# Patient Record
Sex: MALE | Race: WHITE
[De-identification: names, ages, dates, MRNs, and addresses within clinical notes are randomized per-mention and may not be internally consistent; named-entity substitution may affect disease eponyms.]

---

## 2018-01-22 ENCOUNTER — HOSPITAL ENCOUNTER (EMERGENCY)
Dept: HOSPITAL 74 - JER | Age: 83
Discharge: LEFT BEFORE DISCHARGE | End: 2018-01-22
Payer: COMMERCIAL

## 2018-01-22 VITALS — DIASTOLIC BLOOD PRESSURE: 95 MMHG | HEART RATE: 75 BPM | TEMPERATURE: 98.6 F | SYSTOLIC BLOOD PRESSURE: 145 MMHG

## 2018-01-22 VITALS — BODY MASS INDEX: 31.1 KG/M2

## 2018-01-22 DIAGNOSIS — R42: Primary | ICD-10-CM

## 2018-01-22 DIAGNOSIS — Z86.73: ICD-10-CM

## 2018-01-22 DIAGNOSIS — Z95.0: ICD-10-CM

## 2018-01-22 DIAGNOSIS — I10: ICD-10-CM

## 2018-01-22 DIAGNOSIS — Z79.01: ICD-10-CM

## 2018-01-22 DIAGNOSIS — E78.00: ICD-10-CM

## 2018-01-22 DIAGNOSIS — I48.91: ICD-10-CM

## 2018-01-22 DIAGNOSIS — R06.02: ICD-10-CM

## 2018-01-22 LAB
ALBUMIN SERPL-MCNC: 3.9 G/DL (ref 3.4–5)
ALP SERPL-CCNC: 86 U/L (ref 45–117)
ALT SERPL-CCNC: 27 U/L (ref 12–78)
ANION GAP SERPL CALC-SCNC: 5 MMOL/L (ref 8–16)
APPEARANCE UR: CLEAR
AST SERPL-CCNC: 13 U/L (ref 15–37)
BASOPHILS # BLD: 0.4 % (ref 0–2)
BILIRUB SERPL-MCNC: 0.8 MG/DL (ref 0.2–1)
BILIRUB UR STRIP.AUTO-MCNC: NEGATIVE MG/DL
BUN SERPL-MCNC: 28 MG/DL (ref 7–18)
CALCIUM SERPL-MCNC: 8.9 MG/DL (ref 8.5–10.1)
CHLORIDE SERPL-SCNC: 104 MMOL/L (ref 98–107)
CO2 SERPL-SCNC: 29 MMOL/L (ref 21–32)
COLOR UR: YELLOW
CREAT SERPL-MCNC: 1.6 MG/DL (ref 0.7–1.3)
DEPRECATED RDW RBC AUTO: 13.9 % (ref 11.9–15.9)
EOSINOPHIL # BLD: 2.6 % (ref 0–4.5)
GLUCOSE SERPL-MCNC: 98 MG/DL (ref 74–106)
HCT VFR BLD CALC: 51.3 % (ref 35.4–49)
HGB BLD-MCNC: 16.8 GM/DL (ref 11.7–16.9)
KETONES UR QL STRIP: NEGATIVE
LEUKOCYTE ESTERASE UR QL STRIP.AUTO: NEGATIVE
LYMPHOCYTES # BLD: 19.2 % (ref 8–40)
MCH RBC QN AUTO: 29.8 PG (ref 25.7–33.7)
MCHC RBC AUTO-ENTMCNC: 32.8 G/DL (ref 32–35.9)
MCV RBC: 90.8 FL (ref 80–96)
MONOCYTES # BLD AUTO: 9.6 % (ref 3.8–10.2)
NEUTROPHILS # BLD: 68.2 % (ref 42.8–82.8)
NITRITE UR QL STRIP: NEGATIVE
PH UR: 5 [PH] (ref 5–8)
PLATELET # BLD AUTO: 204 K/MM3 (ref 134–434)
PLATELET BLD QL SMEAR: ADEQUATE
PMV BLD: 10.1 FL (ref 7.5–11.1)
POTASSIUM SERPLBLD-SCNC: 4.5 MMOL/L (ref 3.5–5.1)
PROT SERPL-MCNC: 7.9 G/DL (ref 6.4–8.2)
PROT UR QL STRIP: NEGATIVE
PROT UR QL STRIP: NEGATIVE
RBC # BLD AUTO: 5.65 M/MM3 (ref 4–5.6)
RBC # UR STRIP: NEGATIVE /UL
SODIUM SERPL-SCNC: 138 MMOL/L (ref 136–145)
SP GR UR: 1.02 (ref 1–1.03)
UROBILINOGEN UR STRIP-MCNC: 2 MG/DL (ref 0.2–1)
WBC # BLD AUTO: 10.1 K/MM3 (ref 4–10)

## 2018-01-22 NOTE — PDOC
History of Present Illness





- General


Chief Complaint: Lightheaded


Stated Complaint: NEAR SYNCOPE/ DIZZINESS


Time Seen by Provider: 01/22/18 14:56


History Source: Patient





- History of Present Illness


Associated Symptoms: denies: chest pain, fever/chills, headaches, nausea/

vomiting, shortness of breath, syncope





Past History





- Past Medical History


Allergies/Adverse Reactions: 


 Allergies











Allergy/AdvReac Type Severity Reaction Status Date / Time


 


No Known Allergies Allergy   Verified 01/22/18 12:19











Home Medications: 


Ambulatory Orders





Hydrochlorothiazide [Hctz] 12.5 mg PO DAILY 08/03/12 


Atorvastatin Ca [Lipitor] 10 mg PO HS 09/19/16 


Carvedilol 25 mg PO BID 09/19/16 


Losartan Potassium [Cozaar -] 50 mg PO DAILY 09/19/16 


Warfarin Sodium [Coumadin] 5 mg PO ASDIR 09/20/16 


Amlodipine Besylate 5 mg PO DAILY 01/22/18 








Anemia: No


Asthma: No


Cancer: No


Cardiac Disorders: Yes (CAD)


CVA: Yes


COPD: No


CHF: No


DVT: No


Dementia: No


Diabetes: No


GI Disorders: No


 Disorders: No


HTN: Yes


Hypercholesterolemia: Yes


Liver Disease: No


Seizures: No


Thyroid Disease: No





- Surgical History


Abdominal Surgery: No


Appendectomy: No


Cardiac Surgery: Yes (PACEMAKER 2011)


Cholecystectomy: No


Lung Surgery: No


Neurologic Surgery: No


Orthopedic Surgery: No





- Suicide/Smoking/Psychosocial Hx


Smoking Status: No


Smoking History: Never smoked


Have you smoked in the past 12 months: No


Number of Cigarettes Smoked Daily: 0


Information on smoking cessation initiated: No


Hx Alcohol Use: Yes (SOCIALLY)


Drug/Substance Use Hx: No


Substance Use Type: None


Hx Substance Use Treatment: No





**Review of Systems





- Review of Systems


Constitutional: No: Chills, Fever


HEENTM: No: Blurred Vision


Respiratory: No: Cough, Shortness of Breath


Cardiac (ROS): No: Chest Pain, Palpitations, Syncope


ABD/GI: No: Nausea, Vomiting


Neurological: Yes: Dizziness.  No: Headache, Numbness, Tingling, Weakness





*Physical Exam





- Vital Signs


 Last Vital Signs











Temp Pulse Resp BP Pulse Ox


 


 98.6 F   75   18   145/95   100 


 


 01/22/18 12:22  01/22/18 12:22  01/22/18 12:22  01/22/18 12:22  01/22/18 12:22














- Physical Exam


General Appearance: Yes: Appropriately Dressed.  No: Apparent Distress


HEENT: positive: Normal Voice


Neck: positive: Supple


Respiratory/Chest: positive: Lungs Clear, Normal Breath Sounds.  negative: 

Respiratory Distress


Cardiovascular: positive: Regular Rate, S1, S2


Gastrointestinal/Abdominal: positive: Soft.  negative: Tender


Integumentary: positive: Dry, Warm


Neurologic: positive: Fully Oriented, Alert, Normal Mood/Affect, Motor Strength 

5/5, Finger to Nose.  negative: Facial Droop, Confused, Disoriented (no 

nystagmus, El intact, no drift, no ataxia)





**Heart Score/ECG Review





- ECG Intrepretation


Comment:: 





01/22/18 17:19


Paced rhythym





ED Treatment Course





- LABORATORY


CBC & Chemistry Diagram: 


 01/22/18 15:25





 01/22/18 15:25





- RADIOLOGY


Radiology Studies Ordered: 














 Category Date Time Status


 


 HEAD CT WITHOUT CONTRAST [CT] Stat CT Scan  01/22/18 15:01 Ordered


 


 CHEST X-RAY PORTABLE* [RAD] Stat Radiology  01/22/18 14:58 Ordered














Medical Decision Making





- Medical Decision Making





01/22/18 15:23


84-year-old male history of hyperlipidemia, hypertension, pacemaker, A.fib on 

coumadin, CVA without deficits, here with dizziness.  Patient states around 9:

30 AM today he developed sudden onset sensation of the room spinning while 

ambulating, causing him to have a near fall. Denies head injury or LOC. Denies 

HA,  nausea, vomiting, blurry vision or focal weakness. No chest pain or 

shortness of breath.  States dizziness lasted for 5 minutes and has since 

resolved with no recurrence.  Current symptoms are different from his prior CVA 

which presented with confusion per patient and wife. No recent uri, tinnitus or 

HL





See exam





Vertigo this am, since resolved


No associated sxs


No recent URI


Stable w/ no neurological deficits


Possibly peripheral, less likely central given presentation


-ekg


-labs


-head CT


-will discuss further w/u w/ ED attg








01/22/18 17:31


CT read as no acute infarcts or bleed.  There are lacunar infarcts seen of that 

is age indeterminate.  Patient remains stable and well appearing with no 

further episodes of vertigo.  Will discuss case w/ neuro and with PMD, Dr. Raza








01/22/18 17:59


Dr Raza paged several times w/ no call back. Also called on his cell and left 

message to call back. 





01/22/18 18:02





01/22/18 18:24


Case and CT findings discussed with Dr King of neurology who recommended at 

least admitting overnight on telemetry.  M.D. states he will evaluate patient 

later on tonight or in the a.m.  I informed patient of recommendation per 

neurology, but patient adamantly refuses admission.  Was informed of medical 

risks to himself, such as stroke and/or death.  Patient verbalized understanding

, but still insists on leaving.  Appears to have capacity at this time.  

Patient signed AMA forms, had IV removed by ED nurse, and walked out of ED with 

wife. At this time, there was no call back from Dr Raza











*DC/Admit/Observation/Transfer


Diagnosis at time of Disposition: 


 Vertigo








- Discharge Dispostion


Disposition: AGAINST MEDICAL ADVICE


Condition at time of disposition: Stable





- Referrals


Referrals: 


Du Raza MD, MD [Primary Care Provider] - 





- Patient Instructions


Printed Discharge Instructions:  Vertigo


Additional Instructions: 


You have left AGAINST MEDICAL ADVICE despite being informed of the wrist 

yourselves such as stroke or death.  You are aware that you can return to the 

ER at any point to continue your evaluation.  


Please contact your PMD in the morning





- Post Discharge Activity

## 2018-01-24 NOTE — EKG
Test Reason : 

Blood Pressure : ***/*** mmHG

Vent. Rate : 076 BPM     Atrial Rate : 090 BPM

   P-R Int : 000 ms          QRS Dur : 182 ms

    QT Int : 422 ms       P-R-T Axes : 000 -78 086 degrees

   QTc Int : 474 ms

 

Ventricular-paced rhythm

ABNORMAL ECG

WHEN COMPARED WITH ECG OF 03-AUG-2012 16:07,

ELECTRONIC VENTRICULAR PACEMAKER HAS REPLACED SINUS RHYTHM

Confirmed by ROSALVA BARBA MD ([id number]) on 1/24/2018 8:07:04 AM

 

Referred By:             Confirmed By:ROSALVA BARBA MD

## 2020-11-02 ENCOUNTER — HOSPITAL ENCOUNTER (OUTPATIENT)
Dept: HOSPITAL 74 - JER | Age: 85
Setting detail: OBSERVATION
LOS: 2 days | Discharge: HOME | End: 2020-11-04
Attending: FAMILY MEDICINE | Admitting: INTERNAL MEDICINE
Payer: COMMERCIAL

## 2020-11-02 VITALS — BODY MASS INDEX: 31.1 KG/M2

## 2020-11-02 DIAGNOSIS — E78.00: ICD-10-CM

## 2020-11-02 DIAGNOSIS — Z86.73: ICD-10-CM

## 2020-11-02 DIAGNOSIS — Z79.01: ICD-10-CM

## 2020-11-02 DIAGNOSIS — Z95.0: ICD-10-CM

## 2020-11-02 DIAGNOSIS — I11.0: ICD-10-CM

## 2020-11-02 DIAGNOSIS — E66.9: ICD-10-CM

## 2020-11-02 DIAGNOSIS — R40.4: ICD-10-CM

## 2020-11-02 DIAGNOSIS — I25.10: ICD-10-CM

## 2020-11-02 DIAGNOSIS — G45.9: Primary | ICD-10-CM

## 2020-11-02 DIAGNOSIS — I48.91: ICD-10-CM

## 2020-11-02 DIAGNOSIS — Z23: ICD-10-CM

## 2020-11-02 LAB
ALBUMIN SERPL-MCNC: 3.3 G/DL (ref 3.4–5)
ALP SERPL-CCNC: 77 U/L (ref 45–117)
ALT SERPL-CCNC: 21 U/L (ref 13–61)
ANION GAP SERPL CALC-SCNC: 8 MMOL/L (ref 8–16)
APPEARANCE UR: CLEAR
APTT BLD: 34.9 SECONDS (ref 25.2–36.5)
AST SERPL-CCNC: 18 U/L (ref 15–37)
BACTERIA # UR AUTO: 5.7 /UL (ref 0–1359)
BASOPHILS # BLD: 0.5 % (ref 0–2)
BILIRUB SERPL-MCNC: 0.7 MG/DL (ref 0.2–1)
BILIRUB UR STRIP.AUTO-MCNC: NEGATIVE MG/DL
BUN SERPL-MCNC: 33.1 MG/DL (ref 7–18)
CALCIUM SERPL-MCNC: 8.7 MG/DL (ref 8.5–10.1)
CHLORIDE SERPL-SCNC: 103 MMOL/L (ref 98–107)
CHOLEST SERPL-MCNC: 133 MG/DL (ref 50–200)
CO2 SERPL-SCNC: 26 MMOL/L (ref 21–32)
COLOR UR: YELLOW
CREAT SERPL-MCNC: 1.5 MG/DL (ref 0.55–1.3)
DEPRECATED RDW RBC AUTO: 14.1 % (ref 11.9–15.9)
EOSINOPHIL # BLD: 2.4 % (ref 0–4.5)
EPITH CASTS URNS QL MICRO: 1.4 /UL (ref 0–25.1)
GLUCOSE SERPL-MCNC: 88 MG/DL (ref 74–106)
HCT VFR BLD CALC: 49.1 % (ref 35.4–49)
HDLC SERPL-MCNC: 47 MG/DL (ref 40–60)
HGB BLD-MCNC: 15.9 GM/DL (ref 11.7–16.9)
INR BLD: 2.29 (ref 0.83–1.09)
KETONES UR QL STRIP: NEGATIVE
LDLC SERPL CALC-MCNC: 79 MG/DL (ref 5–100)
LEUKOCYTE ESTERASE UR QL STRIP.AUTO: NEGATIVE
LYMPHOCYTES # BLD: 23.7 % (ref 8–40)
MCH RBC QN AUTO: 29.2 PG (ref 25.7–33.7)
MCHC RBC AUTO-ENTMCNC: 32.5 G/DL (ref 32–35.9)
MCV RBC: 90 FL (ref 80–96)
MONOCYTES # BLD AUTO: 11.8 % (ref 3.8–10.2)
NEUTROPHILS # BLD: 61.6 % (ref 42.8–82.8)
NITRITE UR QL STRIP: NEGATIVE
PH UR: 5.5 [PH] (ref 5–8)
PLATELET # BLD AUTO: 197 K/MM3 (ref 134–434)
PMV BLD: 9.5 FL (ref 7.5–11.1)
POTASSIUM SERPLBLD-SCNC: 4.8 MMOL/L (ref 3.5–5.1)
PROT SERPL-MCNC: 6.7 G/DL (ref 6.4–8.2)
PROT UR QL STRIP: NEGATIVE
PROT UR QL STRIP: NEGATIVE
PT PNL PPP: 27 SEC (ref 9.7–13)
RBC # BLD AUTO: 1.5 /UL (ref 0–23.9)
RBC # BLD AUTO: 5.45 M/MM3 (ref 4–5.6)
SODIUM SERPL-SCNC: 136 MMOL/L (ref 136–145)
SP GR UR: 1.05 (ref 1.01–1.03)
TRIGL SERPL-MCNC: 193 MG/DL (ref 0–150)
UROBILINOGEN UR STRIP-MCNC: 1 MG/DL (ref 0.2–1)
WBC # BLD AUTO: 8.8 K/MM3 (ref 4–10)

## 2020-11-02 PROCEDURE — 3E0234Z INTRODUCTION OF SERUM, TOXOID AND VACCINE INTO MUSCLE, PERCUTANEOUS APPROACH: ICD-10-PCS | Performed by: FAMILY MEDICINE

## 2020-11-02 PROCEDURE — C9803 HOPD COVID-19 SPEC COLLECT: HCPCS

## 2020-11-02 PROCEDURE — G0378 HOSPITAL OBSERVATION PER HR: HCPCS

## 2020-11-02 PROCEDURE — U0003 INFECTIOUS AGENT DETECTION BY NUCLEIC ACID (DNA OR RNA); SEVERE ACUTE RESPIRATORY SYNDROME CORONAVIRUS 2 (SARS-COV-2) (CORONAVIRUS DISEASE [COVID-19]), AMPLIFIED PROBE TECHNIQUE, MAKING USE OF HIGH THROUGHPUT TECHNOLOGIES AS DESCRIBED BY CMS-2020-01-R: HCPCS

## 2020-11-02 RX ADMIN — SODIUM CHLORIDE SCH MLS/HR: 9 INJECTION, SOLUTION INTRAVENOUS at 17:50

## 2020-11-02 NOTE — PDOC
History of Present Illness





<Zak Hurley - Last Filed: 11/02/20 19:53>





- History of Present Illness


Initial Comments: 





11/02/20 17:07


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 

weakness. LKW was 1630 per wife. She walked into the living room and asked him 

to stand up. He replied "I don't know how." Then he became aphasic. On 

presentation he was not answering questions consistently and demonstrated RUE 

weakness.





He has been seen here 2 times already here within the last week for similar 

presentations. He has received multiple CT scans. Upon this patient's first 

encounter for these episodes about 1 week ago, he was admitted to the hospital 

and then AMA'ed himself three hours after admission.





<Al Perez - Last Filed: 11/04/20 21:57>





- General


Chief Complaint: CVA/TIA


Stated Complaint: POSS STROKE


Time Seen by Provider: 11/02/20 17:05





NIH Stroke Scale





- Initial Evaluation


Level of consciousness: Alert


Ask patient the month and their age: Answers both correctly


Ask patient to open & close eyes; make fist and let go: Obeys both correctly


Best gaze (horizontal eye movement): Normal


Visual field testing: No visual field loss


Facial paresis (Show teeth/raise eyebrows/close eyes tight): Normal symmetrical 

movement


Motor Function: Left Arm: Normal


Motor Function:  Right Arm: Normal (extends arm 90 (or 45) degrees for 10 

seconds without drift


Motor Function:  Left Leg: Normal (extends leg 30 degrees for 5 seconds without 

drift)


Motor Function:  Right Leg: Normal (extends leg 30 degrees for 5 seconds without

drift)


Limb Ataxia: Untestable (Joint fused or limb amputated), explain:


Sensory(Use pinprick test arms,legs,trunk,face/side to side): Normal


Best language (Describe picture, name items, read sentences): No Aphasia


Dysarthria (read several words): Mild to moderate slurring of words


Extinction and Inattention: No abnormality





- Total Score


NIH Stroke Scale Score: 1





<Al Perez - Last Filed: 11/04/20 21:57>





Past History





<Zak Hurley - Last Filed: 11/02/20 19:53>





- Medical History


Anemia: No


Asthma: No


Cancer: No


Cardiac Disorders: Yes (CAD)


CVA: Yes


COPD: No


CHF: No


DVT: No


Dementia: No


Diabetes: No


GI Disorders: No


 Disorders: No


HTN: Yes


Hypercholesterolemia: Yes


Liver Disease: No


Seizures: No


Thyroid Disease: No





- Surgical History


Abdominal Surgery: No


Appendectomy: No


Cardiac Surgery: Yes (PACEMAKER 2011)


Cholecystectomy: No


Lung Surgery: No


Neurologic Surgery: No


Orthopedic Surgery: No





- Immunization History


Immunization Up to Date: Yes





- Psycho-Social/Smoking History


Smoking Status: No


Smoking History: Never smoked


Have you smoked in the past 12 months: No


Number of Cigarettes Smoked Daily: 0





<Al Perez - Last Filed: 11/04/20 21:57>





- Medical History


Allergies/Adverse Reactions: 


                                    Allergies











Allergy/AdvReac Type Severity Reaction Status Date / Time


 


No Known Allergies Allergy   Verified 10/30/20 10:00











Home Medications: 


Ambulatory Orders





Hydrochlorothiazide [Hctz] 12.5 mg PO DAILY 08/03/12 


Atorvastatin Ca [Lipitor] 10 mg PO HS 09/19/16 


Carvedilol 25 mg PO BID 09/19/16 


Losartan Potassium [Cozaar -] 50 mg PO DAILY 09/19/16 


Warfarin Sodium [Coumadin] 5 mg PO ASDIR 09/20/16 


Amlodipine Besylate 5 mg PO DAILY 01/22/18 


Aspirin [ASA -] 81 mg PO DAILY #30 tab.chew 11/03/20 


Warfarin Na [Coumadin -] 5 mg PO DAILY@1800  tablet 11/03/20 











**Review of Systems





- Review of Systems


Able to Perform ROS?: Yes


Constitutional: No: Chills, Diaphoresis, Fever


HEENTM: No: Symptoms Reported


Respiratory: No: Symptoms reported


Cardiac (ROS): No: Symptoms Reported


ABD/GI: No: Symptoms Reported


: No: Symptoms Reported


Musculoskeletal: No: Symptoms Reported


Integumentary: No: Symptoms Reported


Neurological: Yes: Unsteady Gait, Ataxia.  No: Weakness


Endocrine: No: Symptoms Reported


Hematologic/Lymphatic: No: Symptoms Reported


All Other Systems: Reviewed and Negative





<Al Perez - Last Filed: 11/04/20 21:57>





*Physical Exam





- Vital Signs


                                Last Vital Signs











Temp Pulse Resp BP Pulse Ox


 


 36.8 C   75   20   137/83   100 


 


 11/02/20 17:20  11/02/20 18:20  11/02/20 18:20  11/02/20 18:20  11/02/20 18:20














<Zak Hurley - Last Filed: 11/02/20 19:53>





- Physical Exam


General Appearance: Yes: Nourished, Appropriately Dressed.  No: Apparent 

Distress


HEENT: positive: EOMI, SANTANA, Normal ENT Inspection, Normal Voice


Neck: positive: Trachea midline, Supple.  negative: Tender, Carotid bruit


Respiratory/Chest: positive: Lungs Clear, Normal Breath Sounds.  negative: 

Respiratory Distress


Cardiovascular: positive: Regular Rhythm.  negative: Bradycardia


Vascular Pulses: Carotid (R): 2+, Carotid (L): 2+, Dorsalis-Pedis (R): 1+, 

Doralis-Pedis (L): 1+


Gastrointestinal/Abdominal: positive: Normal Bowel Sounds, Soft


Musculoskeletal: positive: Normal Inspection.  negative: CVA Tenderness


Extremity: positive: Normal Capillary Refill, Normal Inspection, Normal Range of

Motion


Integumentary: positive: Normal Color, Dry, Warm


Neurologic: positive: CNs II-XII NML intact, Alert, Normal Response, Other (At 

time of initial presentation, pt was AMS, weak on the R side, and aphasic. After

CT exam, when pt got to room, he was seemingly recovered.).  negative: Fully 

Oriented





<Al Perez - Last Filed: 11/04/20 21:57>





ED Treatment Course





- LABORATORY


CBC & Chemistry Diagram: 


                                 11/02/20 17:40





                                 11/02/20 17:40





- ADDITIONAL ORDERS


Additional order review: 


                               Laboratory  Results











  11/02/20 11/02/20 11/02/20





  18:20 17:41 17:40


 


PT with INR   


 


INR   


 


PTT (Actin FS)   


 


Sodium   


 


Potassium   


 


Chloride   


 


Carbon Dioxide   


 


Anion Gap   


 


BUN   


 


Creatinine   


 


Est GFR (CKD-EPI)AfAm   


 


Est GFR (CKD-EPI)NonAf   


 


POC Glucometer   96 


 


Random Glucose   


 


Calcium   


 


Total Bilirubin   


 


AST   


 


ALT   


 


Alkaline Phosphatase   


 


Creatine Kinase   


 


Troponin I   


 


Total Protein   


 


Albumin   


 


Triglycerides   


 


Cholesterol   


 


Total LDL Cholesterol   


 


HDL Cholesterol   


 


Urine Color  Yellow  


 


Urine Appearance  Clear  


 


Urine pH  5.5  


 


Ur Specific Gravity  1.053 H  


 


Urine Protein  Negative  


 


Urine Glucose (UA)  Negative  


 


Urine Ketones  Negative  


 


Urine Blood  Negative  


 


Urine Nitrite  Negative  


 


Urine Bilirubin  Negative  


 


Urine Urobilinogen  1.0  


 


Ur Leukocyte Esterase  Negative  


 


Urine WBC (Auto)  .8  


 


Urine RBC (Auto)  1.5  


 


Urine Casts (Auto)  .38  


 


U Epithel Cells (Auto)  1.4  


 


Urine Bacteria (Auto)  5.7  


 


Blood Type    B POSITIVE


 


Antibody Screen    Negative














  11/02/20 11/02/20 11/02/20





  17:40 17:40 17:40


 


PT with INR    27.00 H


 


INR    2.29 H


 


PTT (Actin FS)    34.9


 


Sodium  136  


 


Potassium  4.8  


 


Chloride  103  


 


Carbon Dioxide  26  


 


Anion Gap  8  


 


BUN  33.1 H  


 


Creatinine  1.5 H  


 


Est GFR (CKD-EPI)AfAm  47.83  


 


Est GFR (CKD-EPI)NonAf  41.27  


 


POC Glucometer   


 


Random Glucose  88  


 


Calcium  8.7  


 


Total Bilirubin  0.7  


 


AST  18  


 


ALT  21  


 


Alkaline Phosphatase  77  


 


Creatine Kinase  82  


 


Troponin I  0.04  


 


Total Protein  6.7  


 


Albumin  3.3 L  


 


Triglycerides   193 H 


 


Cholesterol   133 


 


Total LDL Cholesterol   79 


 


HDL Cholesterol   47 


 


Urine Color   


 


Urine Appearance   


 


Urine pH   


 


Ur Specific Gravity   


 


Urine Protein   


 


Urine Glucose (UA)   


 


Urine Ketones   


 


Urine Blood   


 


Urine Nitrite   


 


Urine Bilirubin   


 


Urine Urobilinogen   


 


Ur Leukocyte Esterase   


 


Urine WBC (Auto)   


 


Urine RBC (Auto)   


 


Urine Casts (Auto)   


 


U Epithel Cells (Auto)   


 


Urine Bacteria (Auto)   


 


Blood Type   


 


Antibody Screen   








                                        











  11/02/20 11/02/20





  17:41 17:40


 


RBC   5.45


 


MCV   90.0


 


MCHC   32.5


 


RDW   14.1


 


MPV   9.5


 


Neutrophils %   61.6


 


Lymphocytes %   23.7  D


 


Monocytes %   11.8 H


 


Eosinophils %   2.4  D


 


Basophils %   0.5


 


POC Glucometer  96 














- RADIOLOGY


Radiograph Interpretation: 





CTA of Head and Neck:


THIS IS A PRELIMINARY REPORT


DATE OF SERVICE: 2020-11-02 17:17:12


IMAGES: [id number]


EXAM: CTA NECK/BRAIN(STROKE)


HISTORY: . Rule out stroke..


COMPARISON: CT brain of the same date.


FINDINGS: . The included upper lungs are well aerated.


Atherosclerotic change in the aorta. Visualized aorta nondilated.


Atherosclerotic plaque in the carotid bulbs and proximal ICAs bilaterally. This 

is slightly more


pronounced on the right than the left, with less than 40% stenosis of the 

proximal right ICA and no


evidence of significant stenosis of the proximal left ICA.


Atherosclerotic plaque also identified more distally in the ICAs without 

evidence of significant


stenosis.


Vertebral arteries enhance normally and are symmetric although are small 

vessels. The left


vertebral artery appears to terminate in a PICA. The right vertebral artery 

supplies the basilar


artery, also a small vessel but appears patent.


Anterior cerebral arteries normal in caliber. No stenosis or aneurysmal 

dilatation.


Middle cerebral arteries normal in caliber without stenosis or aneurysmal 

dilatation.


Posterior cerebral arteries enhance normally and are normal in caliber. No 

evidence of stenosis or


aneurysmal dilatation. No abnormal enhancing intracranial lesion.


Incidental note made of near complete e opacification of the left maxillary 

sinus, appears to be


secondary to a large mucus retention cyst. Paranasal sinuses otherwise appear 

well-aerated..


Degenerative changes cervical spine, vertebral height loss does not appear acute

C6 and C7. Disc


space narrowing C4-C5, C5-C6 and C6-C7..


Pacemaker/ICD battery noted on the left.


IMPRESSION: . No evidence of significant stenosis of the common or internal 

carotid arteries.


Vertebral arteries also appear patent. Vasculature [place] enhances 

normally without


evidence of stenosis or aneurysmal dilatation.


No abnormal enhancing intracranial lesion is seen...


One or more of the following dose reduction techniques were used: automated 

exposure control,


adjustment of the mA and/or kV according to patient size, use of iterative 

reconstructive technique.


THIS DOCUMENT HAS BEEN ELECTRONICALLY SIGNED


Dahiana Singletary MD


11/02/2020 18:25 EST





<Zak Hurley - Last Filed: 11/02/20 19:53>





- LABORATORY


CBC & Chemistry Diagram: 


                                 11/03/20 05:49





                                 11/04/20 05:58





- RADIOLOGY


Radiograph Interpretation: 


CT brain of the same date.


FINDINGS: . The included upper lungs are well aerated.


Atherosclerotic change in the aorta. Visualized aorta nondilated.


Atherosclerotic plaque in the carotid bulbs and proximal ICAs bilaterally. This 

is slightly more


pronounced on the right than the left, with less than 40% stenosis of the 

proximal right ICA and no


evidence of significant stenosis of the proximal left ICA.


Atherosclerotic plaque also identified more distally in the ICAs without 

evidence of significant


stenosis.


Vertebral arteries enhance normally and are symmetric although are small 

vessels. The left


vertebral artery appears to terminate in a PICA. The right vertebral artery 

supplies the basilar


artery, also a small vessel but appears patent.


Anterior cerebral arteries normal in caliber. No stenosis or aneurysmal 

dilatation.


Middle cerebral arteries normal in caliber without stenosis or aneurysmal 

dilatation.


Posterior cerebral arteries enhance normally and are normal in caliber. No 

evidence of stenosis or


aneurysmal dilatation. No abnormal enhancing intracranial lesion.


Incidental note made of near complete e opacification of the left maxillary 

sinus, appears to be


secondary to a large mucus retention cyst. Paranasal sinuses otherwise appear 

well-aerated..


Degenerative changes cervical spine, vertebral height loss does not appear acute

C6 and C7. Disc


space narrowing C4-C5, C5-C6 and C6-C7..


Pacemaker/ICD battery noted on the left.


IMPRESSION: . No evidence of significant stenosis of the common or internal 

carotid arteries.


Vertebral arteries also appear patent. Vasculature [place] enhances 

normally without


evidence of stenosis or aneurysmal dilatation.


No abnormal enhancing intracranial lesion is seen...


One or more of the following dose reduction techniques were used: automated 

exposure control,


adjustment of the mA and/or kV according to patient size, use of iterative 

reconstructive technique.


THIS DOCUMENT HAS BEEN ELECTRONICALLY SIGNED


Dahiana Singletary MD








<Al Perez - Last Filed: 11/04/20 21:57>





Medical Decision Making





- Medical Decision Making





11/02/20 19:38


per Dr Murray: ASA 81mg qD, admit, will see pt tomorrow.





<Al Perez - Last Filed: 11/04/20 21:57>





Discharge





<Zak Hurley - Last Filed: 11/02/20 19:53>





- Discharge Information


Problems reviewed: Yes





<Al Perez - Last Filed: 11/04/20 21:57>





- Discharge Information


Clinical Impression/Diagnosis: 


Altered mental status


Qualifiers:


 Altered mental status type: transient alteration of awareness Qualified 

Code(s): R40.4 - Transient alteration of awareness





Condition: Stable


Disposition: HOME

## 2020-11-02 NOTE — PDOC
Attending Attestation





- Resident


Resident Name: Al Perez





- ED Attending Attestation


I have performed the following: I have examined & evaluated the patient, The 

case was reviewed & discussed with the resident, I agree w/resident's findings &

plan, Exceptions are as noted





- HPI


HPI: 





11/02/20 17:54


87y M hx of afib sp PM on coumadin, htn, with multiple ?TIA recently (multiple 

CTs, eval by Neuro), was feeling fine until approx 1 hr ago when he was walking 

down the stairs, started feeling generally weak and sluggish, his wife saw him 

sitting down by the stairs and assisted him into a chair and noticed he was not 

speaking clearly.  She called EMS EMS noted the pt was confused, and had some R 

sided weakness. Upon arrival, code grey was initiated.  Pt was rushed to CT and 

got a CTA, upon return to the ED approx 5:50, pt was speaking at his baseline 

and his weakness seemed to have resolved.  Not TPA candidate due to rapid 

improvement. 

















- Physicial Exam


PE: 





11/02/20 18:02





Physical Exam





GENERAL: The patient is awake, alert, NAD


HEAD: Normocephalic, atraumatic.


EYES: extraocular movements intact, sclera anicteric, conjunctiva clear.


ENT: Normal voice,  Moist mucous membranes.


NECK: Normal range of motion, supple


LUNGS: Breath sounds equal, clear to auscultation bilaterally.  No wheezes, no 

rhonchi, no rales.


HEART: Regular rate and rhythm, normal S1 and S2 without murmur, rub or gallop.


ABDOMEN: Soft, nontender, No guarding, no rebound.  No CVA tenderness


EXTREMITIES: Normal range of motion, no edema.  Moving all 4 extremities 

spontaneously symmetrically, sensation symmetric in upper and lower extremities,




NEUROLOGICAL: No facial assymetry, Normal speech, 


PSYCH: Normal mood, normal affect.


SKIN: Warm, Dry, normal turgor,




















- Critical Care Time


Total Critical Care Time: 30


Critical Care Statement: The care of this patient involved high complexity 

decision making to prevent further life threatening deterioration of the 

patient's condition and/or to evaluate & treat vital organ system(s) failure or 

risk of failure.





- Medical Decision Making





11/02/20 18:02


87y M hx of afib on coumadin s/p PM, recent episodes of TIAs presents with 

dysarthria/RUE weakness


rapid improvement, no TPA candidate, 





CT head negative for acute pathology


anticipate admissio for further mnagement


will dw neurology








Discharge





- Discharge Information


Problems reviewed: Yes


Clinical Impression/Diagnosis: 


Altered mental status


Qualifiers:


 Altered mental status type: transient alteration of awareness Qualified 

Code(s): R40.4 - Transient alteration of awareness





Condition: Guarded





- Follow up/Referral





- Patient Discharge Instructions





- Post Discharge Activity

## 2020-11-02 NOTE — XMS
Summarization Of Episode

                           Created on:2020



Patient:SHAHEEN VILLALOBOS

Sex:Male

:1933

External Reference #:[id number]





Demographics







                          Address                   [address] [place] [address]

 

                          Home Phone                [phone]

 

                          Preferred Language        English

 

                          Marital Status            Unknown

 

                          Mormon Affiliation     BA

 

                          Race                      WH

 

                          Ethnic Group              Unknown









Author







                          Organization              [facility]









Support







                Name            Relationship    Address         Phone

 

                RE, RETIRED     Unavailable     Unavailable     Unavailable

 

                RE              Unavailable     Unavailable     Unavailable

 

                SONIA VILLALOBOS    WIFE            [address]  [phone]

 H



                                                [place] [address] 

 

                SONIA VILLALOBOS    Spouse          [address] Unavailable



                                                [place] [address] 









Re-disclosure Warning

The records that you are about to access may contain information from federally-
assisted alcohol or drug abuse programs. If such information is present, then 
the following federally mandated warning applies: This information has been 
disclosed to you from records protected by federal confidentiality rules (42 CFR
part 2). The federal rules prohibit you from making any further disclosure of 
this information unless further disclosure is expressly permitted by the written
consent of the person to whom it pertains or as otherwise permitted by 42 CFR 
part 2. A general authorization for the release of medical or other information 
is NOT sufficient for this purpose. The Federal rules restrict any use of the 
information to criminally investigate or prosecute any alcohol or drug abuse 
patient.The records that you are about to access may contain highly sensitive 
health information, the redisclosure of which is protected by Article 27-F of 
the [facility] Public Health law. If you continue you may haveaccess to 
information: Regarding HIV / AIDS; Provided by facilities licensed or operated 
by the [facility] Office of Mental Health; or Provided by the [facility]
Office for People With Developmental Disabilities. If such information is 
present, then the following [place] mandated warning applies: This 
information has been disclosed to you from confidential records which are 
protected by state law. State law prohibits you from making any further 
disclosure of this information without the specific written consent of the 
person to whom it pertains, or as otherwise permitted by law. Any unauthorized 
further disclosure in violation of state law may result in a fine or [facility] 
sentence or both. A general authorization for the release of medical or other 
information is NOT sufficient authorization for further disclosure.



Insurance Providers







          Payer name Policy type Policy ID Covered   Covered party's Policy    P

angela



                    / Coverage           party ID  relationship to Shannon    Inf

ormation



                    type                          shannon              

 

          [employer]               MEBBLZSL            SP                  [id number]



          MEDICARE                                                    

 

          [employer]           [id number]           SP                  [id number]

[id number]







Results







                    ID                  Date                Data Source

 

                    [id number]         10/28/2020 04:42:00 PM EDT [facility]











          Name      Value     Range     Interpretation Description Data      Sup

porting



                                        Code                Source(s) Document(s

)

 

          SARS                                              [facility]   



          coronavirus 2                                                   



          RNA                                                         









                                        This lab was ordered by [facility] and reported by [facility].











                                        Procedure

## 2020-11-02 NOTE — XMS
Summarization Of Episode

                           Created on:2020



Patient:SHAHEEN VILLALOBOS

Sex:Male

:1933

External Reference #:[id number]





Demographics







                          Address                   [address] [place] [address]

 

                          Home Phone                [phone]

 

                          Preferred Language        English

 

                          Marital Status            Unknown

 

                          Lutheran Affiliation     BA

 

                          Race                      WH

 

                          Ethnic Group              Unknown









Author







                          Organization              [facility]









Support







                Name            Relationship    Address         Phone

 

                RE, RETIRED     Unavailable     Unavailable     Unavailable

 

                RE              Unavailable     Unavailable     Unavailable

 

                SONIA VILLALOBOS    WIFE            [address]  [phone]

 H



                                                [place] [address] 

 

                SONIA VILLALOBOS    Spouse          [address] Unavailable



                                                [place] [address] 









Re-disclosure Warning

The records that you are about to access may contain information from federally-
assisted alcohol or drug abuse programs. If such information is present, then 
the following federally mandated warning applies: This information has been 
disclosed to you from records protected by federal confidentiality rules (42 CFR
part 2). The federal rules prohibit you from making any further disclosure of 
this information unless further disclosure is expressly permitted by the written
consent of the person to whom it pertains or as otherwise permitted by 42 CFR 
part 2. A general authorization for the release of medical or other information 
is NOT sufficient for this purpose. The Federal rules restrict any use of the 
information to criminally investigate or prosecute any alcohol or drug abuse 
patient.The records that you are about to access may contain highly sensitive 
health information, the redisclosure of which is protected by Article 27-F of 
the [facility] Public Health law. If you continue you may haveaccess to 
information: Regarding HIV / AIDS; Provided by facilities licensed or operated 
by the [facility] Office of Mental Health; or Provided by the [facility]
Office for People With Developmental Disabilities. If such information is 
present, then the following [place] mandated warning applies: This 
information has been disclosed to you from confidential records which are 
protected by state law. State law prohibits you from making any further 
disclosure of this information without the specific written consent of the 
person to whom it pertains, or as otherwise permitted by law. Any unauthorized 
further disclosure in violation of state law may result in a fine or custodial 
sentence or both. A general authorization for the release of medical or other 
information is NOT sufficient authorization for further disclosure.



Insurance Providers







          Payer name Policy type Policy ID Covered   Covered party's Policy    P

angela



                    / Coverage           party ID  relationship to Shannon    Inf

ormation



                    type                          shannon              

 

          [employer]               MEBBLZSL            SP                  MEBBLZSL



          MEDICARE                                                    

 

          [employer]           [id number]           SP                  [id number]

[id number]







Results







                    ID                  Date                Data Source

 

                    [id number]         10/28/2020 04:42:00 PM EDT [facility]











          Name      Value     Range     Interpretation Description Data      Sup

porting



                                        Code                Source(s) Document(s

)

 

          SARS                                              [facility]   



          coronavirus 2                                                   



          RNA                                                         









                                        This lab was ordered by [facility] and reported by [facility].











                                        Procedure

## 2020-11-03 LAB
APTT BLD: 32.8 SECONDS (ref 25.2–36.5)
BASOPHILS # BLD: 0.3 % (ref 0–2)
DEPRECATED RDW RBC AUTO: 14.1 % (ref 11.9–15.9)
EOSINOPHIL # BLD: 1.8 % (ref 0–4.5)
HCT VFR BLD CALC: 48 % (ref 35.4–49)
HGB BLD-MCNC: 15.8 GM/DL (ref 11.7–16.9)
INR BLD: 2.18 (ref 0.83–1.09)
LYMPHOCYTES # BLD: 19.7 % (ref 8–40)
MAGNESIUM SERPL-MCNC: 1.9 MG/DL (ref 1.8–2.4)
MCH RBC QN AUTO: 29.6 PG (ref 25.7–33.7)
MCHC RBC AUTO-ENTMCNC: 33 G/DL (ref 32–35.9)
MCV RBC: 89.9 FL (ref 80–96)
MONOCYTES # BLD AUTO: 11.2 % (ref 3.8–10.2)
NEUTROPHILS # BLD: 67 % (ref 42.8–82.8)
PHOSPHATE SERPL-MCNC: 3 MG/DL (ref 2.5–4.9)
PLATELET # BLD AUTO: 192 K/MM3 (ref 134–434)
PMV BLD: 9.7 FL (ref 7.5–11.1)
PT PNL PPP: 25.8 SEC (ref 9.7–13)
RBC # BLD AUTO: 5.33 M/MM3 (ref 4–5.6)
WBC # BLD AUTO: 9.5 K/MM3 (ref 4–10)

## 2020-11-03 RX ADMIN — CARVEDILOL SCH MG: 25 TABLET, FILM COATED ORAL at 09:09

## 2020-11-03 RX ADMIN — AMLODIPINE BESYLATE SCH MG: 5 TABLET ORAL at 09:09

## 2020-11-03 RX ADMIN — HYDROCHLOROTHIAZIDE SCH MG: 12.5 CAPSULE ORAL at 09:09

## 2020-11-03 RX ADMIN — CARVEDILOL SCH MG: 25 TABLET, FILM COATED ORAL at 21:06

## 2020-11-03 RX ADMIN — LOSARTAN POTASSIUM SCH MG: 50 TABLET, FILM COATED ORAL at 09:09

## 2020-11-03 RX ADMIN — ASPIRIN 81 MG SCH MG: 81 TABLET ORAL at 09:09

## 2020-11-03 RX ADMIN — SODIUM CHLORIDE SCH: 9 INJECTION, SOLUTION INTRAVENOUS at 17:42

## 2020-11-03 RX ADMIN — PANTOPRAZOLE SODIUM SCH MG: 40 TABLET, DELAYED RELEASE ORAL at 09:09

## 2020-11-03 NOTE — CONSULT
Admitting History and Physical





- Primary Care Physician


PCP: Leandra Velazco





- Admission


History of Present Illness: 





87y M hx of afib sp PPM on coumadin, htn, with multiple TIA recently with sudden

onset of difficulty speaking and had some R sided weakness. Spontaneous recovery

of symptoms reported to baseline.





Passed dysphagia screen. Pending diet order.


                                Selected Entries











  11/02/20 11/02/20 11/02/20





  16:59 17:20 18:20


 


Pulse Rate [   





Right side   





Sitting]   


 


Pulse Rate [   





Right side   





Standing]   


 


Pulse Rate [   





Right side   





Supine]   


 


Blood Pressure   





[Right side   





Sitting]   


 


Blood Pressure   





[Right side   





Standing]   


 


Blood Pressure   





[Right side   





Supine]   


 


O2 Sat by Pulse 97 100 100





Oximetry (%)   


 


Oxygen Delivery  Room Air Room Air





Method   














  11/02/20 11/02/20 11/02/20





  19:55 21:41 23:50


 


Pulse Rate [   





Right side   





Sitting]   


 


Pulse Rate [   





Right side   





Standing]   


 


Pulse Rate [   





Right side   





Supine]   


 


Blood Pressure   





[Right side   





Sitting]   


 


Blood Pressure   





[Right side   





Standing]   


 


Blood Pressure   





[Right side   





Supine]   


 


O2 Sat by Pulse 98 95 95





Oximetry (%)   


 


Oxygen Delivery Room Air Room Air 





Method   














  11/03/20





  01:55


 


Pulse Rate [ 74





Right side 





Sitting] 


 


Pulse Rate [ 80





Right side 





Standing] 


 


Pulse Rate [ 72





Right side 





Supine] 


 


Blood Pressure 128/72





[Right side 





Sitting] 


 


Blood Pressure 136/92





[Right side 





Standing] 


 


Blood Pressure 138/76





[Right side 





Supine] 


 


O2 Sat by Pulse 





Oximetry (%) 


 


Oxygen Delivery 





Method 








                                Laboratory Tests











  11/02/20 11/02/20 11/03/20





  17:40 18:20 05:49


 


WBC  8.8   9.5


 


COVID-19 (CEDRIC)   Pending 











History Source: Patient


Limitations to Obtaining History: No Limitations





- Past Medical History


Additional Past Medical History: 





as above





- Smoking History


Smoking history: Never smoked


Have you smoked in the past 12 months: No


Aproximately how many cigarettes per day: 0





- Alcohol/Substance Use


Hx Alcohol Use: Yes (SOCIALLY)





History





- Admission


Reason For Visit: TRANSIENT ISCHEMIC ATTACK





- Diagnostics


CT Scan: Report Reviewed





- General


Mental Status: Alert and Oriented, Awake and Alert, Able to Follow Commands


Attention: Intact


Ability to Follow Directions: Excellent


Head/Neck Control: WFL





- Hearing


Hearing: Impaired


Hearing Aide: No





Speech Evaluation





- Communication


Primary Language: [place]


Communication: Yes: Within Normal Limits, Language Barrier


Oral Expression Ability: Yes: No Impairment





- Speech Production


Able to Make Needs Known: Yes: WNL


Intelligibility: Yes: WNL





- Speech Characteristics


Voice Loudness: Normal


Voice Pitch: Yes: Normal


Voice Phonatory-based Quality: Yes: Normal


Speech Pattern: Normal


Nasal Resonance: Normal


Articulation: Yes: Precise


Rate of Speech: Intact





- Language/Auditory Comprehension


Follows: Yes: 1 Stage Simple Commands


Observation: Able to respond to yes/no queries: Yes (in primary lang, not 

English), Yes/No Confusion: No, Comprehends Conversational Speech: Yes (in 

primary lang, not English)





- Language/Verbal Expression


Able to Communicate Wants and Needs: Yes: WNL


Functional Communication Status: Yes: WNL





- Swallow Evaluation/Bedside Assessment


Current Nutritional Intake: NPO


Oral Secretions: Yes: WFL


Dentition: Yes: Adequate


Facial Symmetry at Rest: Symmetrical


Facial Symmetry on Retraction: Symmetrical


Facial Movement: Controlled


Sensation: Normal


Against Resistance Opening: Normal


Against Resistance Closing: Normal


Pucker Lips: Normal


Lingual Movement: Normal, Symmetric


Lingual Speed of Movement: Normal


Lingual Movement Strgth Against Opposition: Normal


Lingual Movement Characteristics: Normal


Velopharyngeal Movement: Normal


Laryngeal Elevation: WFL


Laryngeal Movement: Able to Palpate


Rate of Intake: WFL


Bolus Size: WFL


Labial Seal: WFL


Chewing: WFL


Oral Prep Time: WFL


A-P Transit: WFL


Timing of Swallow: WFL


Coughing/Throat Clear: No


Change in Voice: No





Recommendations





- Speech Evaluation, Impression/Plan


Impression: Language seems intact in primary language. Limited english. [place].  

Swallowing overtly intact





- Dysphagia Impressions/Plan


Swallowing Skills: WFL


Dysphagia Impressions: No Impairment


*Silent aspiration: cannot be R/O at bedside


Dysphagia Treatment Plan: Elevate HOB during feed, OOB for meals, OOB for 1 h. 

after meals





- Recommendations


Diet Consistency: Regular


Medication Administration: Whole with water


Liquids: Thin Liquids

## 2020-11-03 NOTE — EKG
Test Reason : 

Blood Pressure : ***/*** mmHG

Vent. Rate : 065 BPM     Atrial Rate : 082 BPM

   P-R Int : 000 ms          QRS Dur : 176 ms

    QT Int : 458 ms       P-R-T Axes : 000 -78 089 degrees

   QTc Int : 476 ms

 

Ventricular-paced rhythm

ABNORMAL ECG

WHEN COMPARED WITH ECG OF 30-OCT-2020 10:01,

ELECTRONIC VENTRICULAR PACEMAKER HAS REPLACED WIDE QRS RHYTHM

Confirmed by MD PHILIP, CAITIE ([id number]) on 11/3/2020 10:22:18 AM

 

Referred By:             Confirmed By:CAITIE PLAACIOS MD

## 2020-11-03 NOTE — PN
Progress Note, Physician


Chief Complaint: 





AMS


TIA


History of Present Illness: 





87y M hx of afib sp PPM on coumadin, htn, with multiple TIA recently (multiple 

CTs, eval by Neuro, [facility] in the past/recently), was feeling fine until approx 1 

hr prior to arrival  when he was walking down the stairs, started feeling 

generally weak and sluggish, his wife saw him sitting down by the stairs and 

assisted him into a chair and noticed he was not speaking clearly.  She called 

EMS and per EMS noted the pt was confused, and had some R sided weakness. Upon 

arrival, code grey was initiated.  Pt was rushed to CT and got a CTA, upon 

return to the ED approx 5:50, pt was speaking at his baseline and his weakness 

seemed to have resolved.  Not TPA candidate due to rapid improvement. patient' 

PPM is not MRI compatible per prior notes. Last MRI in 2012. 





Multiple CT's in the past for similar episodes. Unable to do MRI due to PPM.


Currently alert and oriented, but as per nursing staff and wife, pt is sometime 

forgetful here and at home as well. As per wife, pt is at his baseline.





- Current Medication List


Current Medications: 


Active Medications





Amlodipine Besylate (Norvasc -)  5 mg PO DAILY [facility]


   Last Admin: 11/03/20 09:09 Dose:  5 mg


   Documented by: 


Aspirin (Asa -)  81 mg PO DAILY [facility]


   Last Admin: 11/03/20 09:09 Dose:  81 mg


   Documented by: 


Atorvastatin Calcium (Lipitor -)  10 mg PO [facility]


Carvedilol (Coreg -)  25 mg PO BID [facility]


   Last Admin: 11/03/20 09:09 Dose:  25 mg


   Documented by: 


Hydrochlorothiazide (Hctz -)  12.5 mg PO DAILY [facility]


   Last Admin: 11/03/20 09:09 Dose:  12.5 mg


   Documented by: 


Sodium Chloride (Normal Saline -)  1,000 mls @ 42 mls/hr IV ASDIR [facility]


   Last Admin: 11/02/20 17:50 Dose:  42 mls/hr


   Documented by: 


Losartan Potassium (Cozaar -)  50 mg PO DAILY [facility]


   Last Admin: 11/03/20 09:09 Dose:  50 mg


   Documented by: 


Pantoprazole Sodium (Protonix -)  40 mg PO DAILY [facility]


   Last Admin: 11/03/20 09:09 Dose:  40 mg


   Documented by: 











- Objective


Vital Signs: 


                                   Vital Signs











Temperature  98.2 F   11/02/20 23:50


 


Pulse Rate  72   11/03/20 01:55


 


Respiratory Rate  20   11/02/20 23:50


 


Blood Pressure  138/76   11/03/20 01:55


 


O2 Sat by Pulse Oximetry (%)  97   11/03/20 09:00











Constitutional: Yes: Well Nourished, No Distress, Calm


Cardiovascular: Yes: Regular Rate and Rhythm


Respiratory: Yes: Regular, CTA Bilaterally


Gastrointestinal: Yes: Normal Bowel Sounds, Soft


Genitourinary: Yes: WNL


Musculoskeletal: Yes: WNL


Extremities: Yes: WNL


Edema: No


Peripheral Pulses WNL: Yes


Neurological: Yes: Alert, Oriented


Psychiatric: Yes: Alert, Oriented


Labs: 


                                    CBC, BMP





                                 11/03/20 05:49 





                                 11/02/20 17:40 





                                    INR, PTT











INR  2.18  (0.83-1.09)  H  11/03/20  05:49    














Problem List





- Problems


(1) Altered mental status


Assessment/Plan: 


-CTA head and neck 11/2/20:Small calcified plaques at the right common carotid 

bifurcation/bulb and the its junction with the proximal internal carotid artery 

without evidence of hemodynamically significant stenosis. There is approximately

40% narrowing of its lumen. Tiny calcified plaques in the left bulb without 

evidence of hemodynamically significant stenosis. Distal left vertebral artery 

ends in a PICA. Hypoplastic but likely patent basilar artery likely due to the 

presence of bilateral prominent posterior communicating arteries and fetal 

origin of both posterior cerebral arteries. Prominent calcified in the distal 

internal carotid artery and cavernous carotid artery, bilaterally. There is no 

evidence of focal hemodynamically significant stenosis, aneurysm, major artery 

cutoff or vascular malformation within the central intracranial arterial 

circulation. The airway is patent without narrowing. Multilevel degenerative 

disc disease in the cervical spine. Large retention cyst versus polyp almost 

totally opacifying the left maxillary antrum. A couple of tiny calcified nodules

in the right upper lobe, anteriorly likely representing granulomas 


-CT head: There is no evidence of acute intracranial hemorrhage, mass lesions or

infarctions. There is a mild to moderate degree of diffuse cerebral atrophy with

sulcal widening and ventricular dilatation. There are hypodense changes t

hroughout the periventricular white matter consistent with chronic, small vessel

ischemia. There is extensive opacification of the left maxillary sinus with an 

air-fluid level suspicious for acute sinusitis. The remainder of the visualized 

paranasal sinuses and mastoid air cells are clear. The calvarium is intact. 


-Restart warfarin


-Add asa 81 mg


-PT eval


-Continue statin


-Check B12


-d/c home in AM





Problems reviewed: Yes   


Code(s): R41.82 - ALTERED MENTAL STATUS, UNSPECIFIED   


Qualifiers: 


   Altered mental status type: transient alteration of awareness   Qualified 

Code(s): R40.4 - Transient alteration of awareness   





(2) TIA (transient ischemic attack)


Problems reviewed: Yes   


Code(s): G45.9 - TRANSIENT CEREBRAL ISCHEMIC ATTACK, UNSPECIFIED   





Assessment/Plan





See Problem list


Spoke to wife Viki about pt

## 2020-11-03 NOTE — HP
Admitting History and Physical





- Primary Care Physician


PCP: Amando Mohr





- Admission


History of Present Illness: 





87y M hx of afib sp PPM on coumadin, htn, with multiple TIA recently (multiple 

CTs, eval by Neuro, AMA in the past/recently), was feeling fine until approx 1 

hr prior to arrival  when he was walking down the stairs, started feeling 

generally weak and sluggish, his wife saw him sitting down by the stairs and 

assisted him into a chair and noticed he was not speaking clearly.  She called 

EMS and per EMS noted the pt was confused, and had some R sided weakness. Upon 

arrival, code grey was initiated.  Pt was rushed to CT and got a CTA, upon 

return to the ED approx 5:50, pt was speaking at his baseline and his weakness 

seemed to have resolved.  Not TPA candidate due to rapid improvement. patient' 

PPM is not MRI compatible per prior notes. Last MRI in 2012. 





Currently the patient denies nausea vomiting fever chills chest pain SOB 

diarrhea constipation weakness or difficulty speaking/swallowing. 


History Source: Patient, Medical Record


Limitations to Obtaining History: Clinical Condition





- Past Medical History


Additional Past Medical History: 





as above





- Smoking History


Smoking history: Never smoked


Have you smoked in the past 12 months: No


Aproximately how many cigarettes per day: 0





- Alcohol/Substance Use


Hx Alcohol Use: Yes (SOCIALLY)





Home Medications





- Allergies


Allergies/Adverse Reactions: 


                                    Allergies











Allergy/AdvReac Type Severity Reaction Status Date / Time


 


No Known Allergies Allergy   Verified 10/30/20 10:00














- Home Medications


Home Medications: 


Ambulatory Orders





Hydrochlorothiazide [Hctz] 12.5 mg PO DAILY 08/03/12 


Atorvastatin Ca [Lipitor] 10 mg PO HS 09/19/16 


Carvedilol 25 mg PO BID 09/19/16 


Losartan Potassium [Cozaar -] 50 mg PO DAILY 09/19/16 


Warfarin Sodium [Coumadin] 5 mg PO ASDIR 09/20/16 


Amlodipine Besylate 5 mg PO DAILY 01/22/18 











Family Medical History


Family History: As Documented (patient does not know)





Review of Systems





- Review of Systems


Constitutional: reports: Weakness


Eyes: reports: No Symptoms


HENT: reports: No Symptoms


Neck: reports: No Symptoms


Cardiovascular: reports: No Symptoms


Respiratory: reports: No Symptoms


Gastrointestinal: reports: No Symptoms


Genitourinary: reports: No Symptoms


Musculoskeletal: reports: Muscle Weakness (right sided)


Neurological: reports: Change in LOC, Change in Speech, Weakness


Endocrine: reports: No Symptoms





Physical Examination


Vital Signs: 


                                   Vital Signs











Temperature  98.2 F   11/02/20 23:50


 


Pulse Rate  68   11/02/20 23:50


 


Respiratory Rate  20   11/02/20 23:50


 


Blood Pressure  127/66   11/02/20 23:50


 


O2 Sat by Pulse Oximetry (%)  95   11/02/20 23:50











Constitutional: Yes: Well Nourished, No Distress, Calm


Eyes: Yes: EOM Intact, PERRL


Neck: Yes: Supple


Cardiovascular: Yes: Other (paced)


Respiratory: Yes: CTA Bilaterally


Gastrointestinal: Yes: Normal Bowel Sounds, Soft, Abdomen, Obese


Musculoskeletal: Yes: WNL


Extremities: Yes: WNL


Edema: No


Neurological: Yes: Alert, Oriented, Cran Nerves II-XII Intact.  No: Dysarthria, 

Facial Droop, Weakness


...Motor Strength: WNL


Psychiatric: Yes: Alert


Labs: 


                                    CBC, BMP





                                 11/02/20 17:40 





                                 11/02/20 17:40 











Imaging





- Results


Cat Scan: Report Reviewed, Image Reviewed





Assessment/Plan





87y M hx of afib sp PM on coumadin, htn, with multiple TIAs recently who 

presented 1 hour after feeling generally weak and sluggish, his wife saw him 

sitting down by the stairs and assisted him into a chair and noticed he was not 

speaking clearly. Code grey initiated and patient had rapid improvement.





TIA


symptoms of Right sided weakness and aphasia resolved


statin


aspirin per. Would try to avoid ASA and coumadin as bleeding risk increases but 

will defer to neuro.


continue Coumadin-find out home dose


INR is therapeutic


BP control


speech and swallow


neuro checks


f/u official CTA head report-night hawk read no carotid artery stenosis and 

[place] WNL


neuro consult


PT


? repeat head CT in AM if neuro recs





A fib s/p PPM


continue coumadin





HTN


continue home meds





HLD


statin





NPO pending S/S and bedside eval








Visit type





- Medication Review


Med list reviewed for High Risk Meds patients 65 and older: Yes





- Emergency Visit


Emergency Visit: Yes


ED Registration Date: 11/02/20


Care time: The patient presented to the Emergency Department on the above date 

and was hospitalized for further evaluation of their emergent condition.





- New Patient


This patient is new to me today: Yes


Date on this admission: 11/03/20





- Critical Care


Critical Care patient: No

## 2020-11-03 NOTE — CONSULT
Consult - text type





- Consultation


Consultation Note: 





                                    Neurology





- Primary Care Physician


PCP: Amando Mohr





- Admission


History of Present Illness: 


87y M hx of afib sp PPM on coumadin, htn, with multiple TIA recently (multiple 

CTs, left AMA in the past/recently), was feeling fine until approx 1 hr prior to

arrival  when he was walking down the stairs, started feeling generally weak and

sluggish, his wife saw him sitting down by the stairs and assisted him into a 

chair and noticed he was not speaking clearly.  She called EMS and per EMS noted

the pt was confused, and had some R sided weakness. Upon arrival, code grey was 

initiated.  Pt was rushed to CT and got a CTA, pt was speaking at his baseline 

and his weakness seemed to have resolved.  Not TPA candidate due to rapid 

improvement. patient' PPM is not MRI compatible per prior notes. I was contacted

by ER and discussed case. Last MRI in 2012. Patient denied nausea, vomiting, 

fever, chills,chest pain, SOB, diarrhea, constipation, weakness, or difficulty 

speaking/swallowing. Head Ct completed, no acute pathology. Head and neck CTA 

done, awaiting official report. Advised starting ASA 81mg, already on AC but 

with repeated events, felt adding antiplatelet is needed at this point. 








- Past Medical History


Additional Past Medical History: 


as above





- Smoking History


Smoking history: Never smoked


Have you smoked in the past 12 months: No


Aproximately how many cigarettes per day: 0





- Alcohol/Substance Use


Hx Alcohol Use: Yes (SOCIALLY)





Family Medical History


Family History: HTN





Review of Systems





- Review of Systems


Constitutional: reports: Weakness


Eyes: reports: No Symptoms


HENT: reports: No Symptoms


Neck: reports: No Symptoms


Cardiovascular: reports: No Symptoms


Respiratory: reports: No Symptoms


Gastrointestinal: reports: No Symptoms


Genitourinary: reports: No Symptoms


Musculoskeletal: reports: Muscle Weakness (right sided)


Neurological: reports: Change in LOC, Change in Speech, Weakness


Endocrine: reports: No Symptoms





Home Medications





- Allergies


Allergies/Adverse Reactions: 


                                    Allergies











Allergy/AdvReac Type Severity Reaction Status Date / Time


 


No Known Allergies Allergy   Verified 10/30/20 10:00














- Home Medications


Home Medications: 


Ambulatory Orders





Hydrochlorothiazide [Hctz] 12.5 mg PO DAILY 08/03/12 


Atorvastatin Ca [Lipitor] 10 mg PO HS 09/19/16 


Carvedilol 25 mg PO BID 09/19/16 


Losartan Potassium [Cozaar -] 50 mg PO DAILY 09/19/16 


Warfarin Sodium [Coumadin] 5 mg PO ASDIR 09/20/16 


Amlodipine Besylate 5 mg PO DAILY 01/22/18 





Active Medications





Amlodipine Besylate (Norvasc -)  5 mg PO DAILY [facility]


Aspirin (Asa -)  81 mg PO DAILY [facility]


Atorvastatin Calcium (Lipitor -)  10 mg PO HS [facility]


Carvedilol (Coreg -)  25 mg PO BID [facility]


Hydrochlorothiazide (Hctz -)  12.5 mg PO DAILY [facility]


Sodium Chloride (Normal Saline -)  1,000 mls @ 42 mls/hr IV ASDIR SAUL


   Last Admin: 11/02/20 17:50 Dose:  42 mls/hr


   Documented by: 


Influenza Virus Vaccine (Flulaval Quad [id number] Syr)  60 mcg IM .ONCE ONE


   Stop: 11/03/20 10:01


Losartan Potassium (Cozaar -)  50 mg PO DAILY [facility]


Pantoprazole Sodium (Protonix -)  40 mg PO DAILY [facility]








Physical Examination


Vital Signs: 


                                   Vital Signs











 Period  Temp  Pulse  Resp  BP Sys/Morelos  Pulse Ox


 


 Last 24 Hr  98.2 F-98.2 F  68-90  15-20  125-138/  














Constitutional: Yes: Well Nourished, No Distress, Calm


Eyes: Yes: EOM Intact, PERRL


Neck: Yes: Supple


Cardiovascular: Yes: Other (paced)


Respiratory: Yes: CTA Bilaterally


Gastrointestinal: Yes: Normal Bowel Sounds, Soft, Abdomen, Obese


Musculoskeletal: Yes: WNL


Extremities: Yes: WNL


Edema: No


Neurological: Yes: Alert, Oriented, Cran Nerves II-XII Intact.  No: Dysarthria, 

Facial Droop, Weakness


...Motor Strength: WNL


Psychiatric: Yes: Alert


Labs: 


                                      CBCD











WBC  9.5 K/mm3 (4.0-10.0)   11/03/20  05:49    


 


RBC  5.33 M/mm3 (4.00-5.60)   11/03/20  05:49    


 


Hgb  15.8 GM/dL (11.7-16.9)   11/03/20  05:49    


 


Hct  48.0 % (35.4-49)   11/03/20  05:49    


 


MCV  89.9 fl (80-96)   11/03/20  05:49    


 


MCHC  33.0 g/dl (32.0-35.9)   11/03/20  05:49    


 


RDW  14.1 % (11.9-15.9)   11/03/20  05:49    


 


Plt Count  192 K/MM3 (134-434)   11/03/20  05:49    


 


MPV  9.7 fl (7.5-11.1)   11/03/20  05:49    








                                       CMP











Sodium  136 mmol/L (136-145)   11/02/20  17:40    


 


Potassium  4.8 mmol/L (3.5-5.1)   11/02/20  17:40    


 


Chloride  103 mmol/L ()   11/02/20  17:40    


 


Carbon Dioxide  26 mmol/L (21-32)   11/02/20  17:40    


 


Anion Gap  8 MMOL/L (8-16)   11/02/20  17:40    


 


BUN  33.1 mg/dL (7-18)  H  11/02/20  17:40    


 


Creatinine  1.5 mg/dL (0.55-1.3)  H  11/02/20  17:40    


 


Random Glucose  88 mg/dL ()   11/02/20  17:40    


 


Calcium  8.7 mg/dL (8.5-10.1)   11/02/20  17:40    


 


Total Bilirubin  0.7 mg/dL (0.2-1)   11/02/20  17:40    


 


AST  18 U/L (15-37)   11/02/20  17:40    


 


ALT  21 U/L (13-61)   11/02/20  17:40    


 


Alkaline Phosphatase  77 U/L ()   11/02/20  17:40    


 


Total Protein  6.7 g/dl (6.4-8.2)   11/02/20  17:40    


 


Albumin  3.3 g/dl (3.4-5.0)  L  11/02/20  17:40    








                                 CARDIAC ENZYMES











Creatine Kinase  82 U/L ()   11/02/20  17:40    


 


Troponin I  0.04 ng/ml (0.00-0.05)   11/02/20  17:40    














Assessment/Plan


87y M hx of afib sp PPM on coumadin, htn, with multiple TIA recently (multiple 

CTs, eval by Neuro, AMA in the past/recently), was feeling fine until approx 1 

hr prior to arrival  when he was walking down the stairs, started feeling 

generally weak and sluggish, his wife saw him sitting down by the stairs and 

assisted him into a chair and noticed he was not speaking clearly.  She called 

EMS and per EMS noted the pt was confused, and had some R sided weakness. Upon 

arrival, code grey was initiated.  Pt was rushed to CT and got a CTA, pt was 

speaking at his baseline and his weakness seemed to have resolved.  Not TPA 

candidate due to rapid improvement. patient' PPM is not MRI compatible per prior

 notes. Last MRI in 2012. Patient denied nausea, vomiting, fever, chills,chest 

pain, SOB, diarrhea, constipation, weakness, or difficulty speaking/swallowing. 

Head Ct completed, no acute pathology. Head and neck CTA done, awaiting official

 report. Advised starting ASA 81mg, already on AC but with repeated events, felt

 adding antiplatelet is needed at this point. On statin 10mg, LDL 79, continue 

current dose. Frequent reorientation, knows he's in the hospital and name of 

President but difficulty with month and year. Follow up official CT reports.

## 2020-11-04 VITALS — HEART RATE: 74 BPM

## 2020-11-04 VITALS — TEMPERATURE: 98 F | DIASTOLIC BLOOD PRESSURE: 93 MMHG | SYSTOLIC BLOOD PRESSURE: 141 MMHG

## 2020-11-04 LAB
ALBUMIN SERPL-MCNC: 3.5 G/DL (ref 3.4–5)
ALP SERPL-CCNC: 81 U/L (ref 45–117)
ALT SERPL-CCNC: 20 U/L (ref 13–61)
ANION GAP SERPL CALC-SCNC: 5 MMOL/L (ref 8–16)
AST SERPL-CCNC: 14 U/L (ref 15–37)
BILIRUB SERPL-MCNC: 1.2 MG/DL (ref 0.2–1)
BUN SERPL-MCNC: 25.4 MG/DL (ref 7–18)
CALCIUM SERPL-MCNC: 9.6 MG/DL (ref 8.5–10.1)
CHLORIDE SERPL-SCNC: 102 MMOL/L (ref 98–107)
CO2 SERPL-SCNC: 29 MMOL/L (ref 21–32)
CREAT SERPL-MCNC: 1.6 MG/DL (ref 0.55–1.3)
GLUCOSE SERPL-MCNC: 105 MG/DL (ref 74–106)
INR BLD: 1.94 (ref 0.83–1.09)
POTASSIUM SERPLBLD-SCNC: 4.7 MMOL/L (ref 3.5–5.1)
PROT SERPL-MCNC: 7.3 G/DL (ref 6.4–8.2)
PT PNL PPP: 23.4 SEC (ref 9.7–13)
SODIUM SERPL-SCNC: 137 MMOL/L (ref 136–145)

## 2020-11-04 RX ADMIN — PANTOPRAZOLE SODIUM SCH MG: 40 TABLET, DELAYED RELEASE ORAL at 09:00

## 2020-11-04 RX ADMIN — LOSARTAN POTASSIUM SCH MG: 50 TABLET, FILM COATED ORAL at 09:00

## 2020-11-04 RX ADMIN — CARVEDILOL SCH MG: 25 TABLET, FILM COATED ORAL at 09:00

## 2020-11-04 RX ADMIN — AMLODIPINE BESYLATE SCH MG: 5 TABLET ORAL at 09:00

## 2020-11-04 RX ADMIN — HYDROCHLOROTHIAZIDE SCH MG: 12.5 CAPSULE ORAL at 09:00

## 2020-11-04 RX ADMIN — ASPIRIN 81 MG SCH MG: 81 TABLET ORAL at 09:00

## 2020-11-04 NOTE — PN
Progress Note (short form)





- Note


Progress Note: 





                                    Neurology





- Primary Care Physician


PCP: Amando Mohr





- Admission


History of Present Illness: 


87y M hx of afib sp PPM on coumadin, htn, with multiple TIA recently (multiple 

CTs, left AMA in the past/recently), was feeling fine until approx 1 hr prior to

arrival  when he was walking down the stairs, started feeling generally weak and

sluggish, his wife saw him sitting down by the stairs and assisted him into a 

chair and noticed he was not speaking clearly.  She called EMS and per EMS noted

the pt was confused, and had some R sided weakness. Upon arrival, code grey was 

initiated.  Pt was rushed to CT and got a CTA, pt was speaking at his baseline 

and his weakness seemed to have resolved.  Not TPA candidate due to rapid 

improvement. patient' PPM is not MRI compatible per prior notes. I was contacted

by ER and discussed case. Last MRI in 2012. Patient denied nausea, vomiting, 

fever, chills,chest pain, SOB, diarrhea, constipation, weakness, or difficulty 

speaking/swallowing. Head Ct completed, no acute pathology. Head and neck CTA 

done, No evidence of significant stenosis of the common or internal carotid 

arteries. Vertebral arteries also appear patent. Vasculature [place] 

enhances normally without evidence of stenosis or aneurysmal dilatation.No 

abnormal enhancing intracranial lesion is seen. Advised starting ASA 81mg, 

already on AC but with repeated events, felt adding antiplatelet is needed at 

this point. Patient trying to leave hospital this morning, asked him to remain 

calm and seated. Wants to go home.





Active Medications





Amlodipine Besylate (Norvasc -)  5 mg PO DAILY [facility]


   Last Admin: 11/03/20 09:09 Dose:  5 mg


   Documented by: 


Aspirin (Asa -)  81 mg PO DAILY [facility]


   Last Admin: 11/03/20 09:09 Dose:  81 mg


   Documented by: 


Atorvastatin Calcium (Lipitor -)  10 mg PO HS [facility]


   Last Admin: 11/03/20 21:06 Dose:  10 mg


   Documented by: 


Carvedilol (Coreg -)  25 mg PO BID [facility]


   Last Admin: 11/03/20 21:06 Dose:  25 mg


   Documented by: 


Hydrochlorothiazide (Hctz -)  12.5 mg PO DAILY [facility]


   Last Admin: 11/03/20 09:09 Dose:  12.5 mg


   Documented by: 


Losartan Potassium (Cozaar -)  50 mg PO DAILY [facility]


   Last Admin: 11/03/20 09:09 Dose:  50 mg


   Documented by: 


Pantoprazole Sodium (Protonix -)  40 mg PO DAILY [facility]


   Last Admin: 11/03/20 09:09 Dose:  40 mg


   Documented by: 


Warfarin Sodium (Coumadin -)  5 mg PO DAILY@1800 [facility]


   Last Admin: 11/03/20 17:44 Dose:  5 mg


   Documented by: 








Physical Examination


Vital Signs: 


                                   Vital Signs











 Period  Temp  Pulse  Resp  BP Sys/Morelos  Pulse Ox


 


 Last 24 Hr  97.3 F-98.3 F  71-81  16-20  115-139/61-84  














Constitutional: Yes: Well Nourished, No Distress, Calm


Eyes: Yes: EOM Intact, PERRL


Neck: Yes: Supple


Cardiovascular: Yes: Other (paced)


Respiratory: Yes: CTA Bilaterally


Gastrointestinal: Yes: Normal Bowel Sounds, Soft, Abdomen, Obese


Musculoskeletal: Yes: WNL


Extremities: Yes: WNL


Edema: No


Neurological: Yes: Alert, Oriented, Cran Nerves II-XII Intact.  No: Dysarthria, 

Facial Droop, Weakness, knows he's in hospital, gait intact








Labs: 


                                      CBCD











WBC  9.5 K/mm3 (4.0-10.0)   11/03/20  05:49    


 


RBC  5.33 M/mm3 (4.00-5.60)   11/03/20  05:49    


 


Hgb  15.8 GM/dL (11.7-16.9)   11/03/20  05:49    


 


Hct  48.0 % (35.4-49)   11/03/20  05:49    


 


MCV  89.9 fl (80-96)   11/03/20  05:49    


 


MCHC  33.0 g/dl (32.0-35.9)   11/03/20  05:49    


 


RDW  14.1 % (11.9-15.9)   11/03/20  05:49    


 


Plt Count  192 K/MM3 (134-434)   11/03/20  05:49    


 


MPV  9.7 fl (7.5-11.1)   11/03/20  05:49    








                                       CMP











Sodium  137 mmol/L (136-145)   11/04/20  05:58    


 


Potassium  4.7 mmol/L (3.5-5.1)   11/04/20  05:58    


 


Chloride  102 mmol/L ()   11/04/20  05:58    


 


Carbon Dioxide  29 mmol/L (21-32)   11/04/20  05:58    


 


Anion Gap  5 MMOL/L (8-16)  L  11/04/20  05:58    


 


BUN  25.4 mg/dL (7-18)  H  11/04/20  05:58    


 


Creatinine  1.6 mg/dL (0.55-1.3)  H  11/04/20  05:58    


 


Random Glucose  105 mg/dL ()   11/04/20  05:58    


 


Calcium  9.6 mg/dL (8.5-10.1)   11/04/20  05:58    


 


Total Bilirubin  1.2 mg/dL (0.2-1)  H  11/04/20  05:58    


 


AST  14 U/L (15-37)  L  11/04/20  05:58    


 


ALT  20 U/L (13-61)   11/04/20  05:58    


 


Alkaline Phosphatase  81 U/L ()   11/04/20  05:58    


 


Total Protein  7.3 g/dl (6.4-8.2)   11/04/20  05:58    


 


Albumin  3.5 g/dl (3.4-5.0)   11/04/20  05:58    








                                 CARDIAC ENZYMES











Creatine Kinase  82 U/L ()   11/02/20  17:40    


 


Troponin I  0.04 ng/ml (0.00-0.05)   11/02/20  17:40    

















Assessment/Plan


87y M hx of afib sp PPM on coumadin, htn, with multiple TIA recently (multiple 

CTs, eval by Neuro, AMA in the past/recently), was feeling fine until approx 1 

hr prior to arrival  when he was walking down the stairs, started feeling 

generally weak and sluggish, his wife saw him sitting down by the stairs and 

assisted him into a chair and noticed he was not speaking clearly.  She called 

EMS and per EMS noted the pt was confused, and had some R sided weakness. Upon 

arrival, code grey was initiated.  Pt was rushed to CT and got a CTA, pt was 

speaking at his baseline and his weakness seemed to have resolved.  Not TPA 

candidate due to rapid improvement. patient' PPM is not MRI compatible per prior

notes. Last MRI in 2012. Patient denied nausea, vomiting, fever, chills,chest 

pain, SOB, diarrhea, constipation, weakness, or difficulty speaking/swallowing. 

Head Ct completed, no acute pathology. Head and neck CTA done, No evidence of 

significant stenosis of the common or internal carotid arteries. Vertebral 

arteries also appear patent. Vasculature [place] enhances normally 

without evidence of stenosis or aneurysmal dilatation. No abnormal enhancing 

intracranial lesion is seen. Advised starting ASA 81mg, already on AC but with 

repeated events, felt adding antiplatelet is needed at this point. On statin 

10mg, LDL 79, continue current dose. Patient irritable this AM and wants to go 

home. Needs safe discharge if he will be leaving.

## 2020-11-04 NOTE — PN
Progress Note, SLP





- Note


Progress Note: 





                                Selected Entries











  11/03/20 11/03/20 11/04/20





  11:06 14:00 02:48


 


Breakfast NPO  


 


Diet Tolerated   


 


Lunch  75% 


 


Temperature   98.3 F


 


Blood Pressure   115/63


 


O2 Sat by Pulse   





Oximetry (%)   


 


Oxygen Delivery   





Method   














  11/04/20 11/04/20 11/04/20





  05:45 08:58 09:00


 


Breakfast   


 


Diet Tolerated   


 


Lunch   


 


Temperature 97.8 F 98 F 


 


Blood Pressure 120/70 141/93 


 


O2 Sat by Pulse 100 98 98





Oximetry (%)   


 


Oxygen Delivery   Room Air





Method   














  11/04/20





  09:54


 


Breakfast 100%


 


Diet Tolerated Well


 


Lunch 


 


Temperature 


 


Blood Pressure 


 


O2 Sat by Pulse 





Oximetry (%) 


 


Oxygen Delivery 





Method 








                                Laboratory Tests











  11/03/20





  05:49


 


WBC  9.5








On reg diet/thin liquids. Tolerating without difficulty. No further f/u 

indicated.

## 2020-11-04 NOTE — DS
Physical Examination


Vital Signs: 


                                   Vital Signs











Temperature  98 F   11/04/20 08:58


 


Pulse Rate  74   11/04/20 05:45


 


Respiratory Rate  20   11/04/20 08:58


 


Blood Pressure  141/93   11/04/20 08:58


 


O2 Sat by Pulse Oximetry (%)  98   11/04/20 09:00











Findings/Remarks: 





87y M hx of afib sp PPM on coumadin, htn, with multiple TIA recently (multiple 

CTs, eval by Neuro, AMA in the past/recently), was feeling fine until approx 1 

hr prior to arrival  when he was walking down the stairs, started feeling 

generally weak and sluggish, his wife saw him sitting down by the stairs and 

assisted him into a chair and noticed he was not speaking clearly.  She called 

EMS and per EMS noted the pt was confused, and had some R sided weakness. Upon 

arrival, code grey was initiated.  Pt was rushed to CT and got a CTA, upon ret

urn to the ED approx 5:50, pt was speaking at his baseline and his weakness 

seemed to have resolved.  Not TPA candidate due to rapid improvement. patient' 

PPM is not MRI compatible per prior notes. Last MRI in 2012. 





Multiple CT's in the past for similar episodes. Unable to do MRI due to PPM.


Currently alert and oriented, but as per nursing staff and wife, pt is sometime 

forgetful here and at home as well. As per wife, pt is at his baseline.





(1) Altered mental status


Assessment/Plan: 


-CTA head and neck 11/2/20:Small calcified plaques at the right common carotid 

bifurcation/bulb and the its junction with the proximal internal carotid artery 

without evidence of hemodynamically significant stenosis. There is approximately

40% narrowing of its lumen. Tiny calcified plaques in the left bulb without 

evidence of hemodynamically significant stenosis. Distal left vertebral artery 

ends in a PICA. Hypoplastic but likely patent basilar artery likely due to the 

presence of bilateral prominent posterior communicating arteries and fetal 

origin of both posterior cerebral arteries. Prominent calcified in the distal 

internal carotid artery and cavernous carotid artery, bilaterally. There is no 

evidence of focal hemodynamically significant stenosis, aneurysm, major artery 

cutoff or vascular malformation within the central intracranial arterial 

circulation. The airway is patent without narrowing. Multilevel degenerative 

disc disease in the cervical spine. Large retention cyst versus polyp almost 

totally opacifying the left maxillary antrum. A couple of tiny calcified nodules

in the right upper lobe, anteriorly likely representing granulomas 


-CT head: There is no evidence of acute intracranial hemorrhage, mass lesions or

infarctions. There is a mild to moderate degree of diffuse cerebral atrophy with

sulcal widening and ventricular dilatation. There are hypodense changes 

throughout the periventricular white matter consistent with chronic, small 

vessel ischemia. There is extensive opacification of the left maxillary sinus 

with an air-fluid level suspicious for acute sinusitis. The remainder of the 

visualized paranasal sinuses and mastoid air cells are clear. The calvarium is 

intact. 


-Restart warfarin


-Add asa 81 mg


-PT eval


-Continue statin


-Check B12


-d/c home in AM





Problems reviewed: Yes   


Code(s): R41.82 - ALTERED MENTAL STATUS, UNSPECIFIED   


Qualifiers: 


   Altered mental status type: transient alteration of awareness   Qualified 

Code(s): R40.4 - Transient alteration of awareness   





(2) TIA (transient ischemic attack)


Problems reviewed: Yes   


Code(s): G45.9 - TRANSIENT CEREBRAL ISCHEMIC ATTACK, UNSPECIFIED   





(3) Afib:


Continue warafrin 5 mg po daily


Repeat INR in 1 week


Constitutional: Yes: Well Nourished, No Distress, Calm


Cardiovascular: Yes: Regular Rate and Rhythm


Respiratory: Yes: Regular, CTA Bilaterally


Gastrointestinal: Yes: Normal Bowel Sounds, Soft


Renal/: Yes: WNL


Musculoskeletal: Yes: WNL


Extremities: Yes: WNL


Edema: No


Peripheral Pulses WNL: Yes


Neurological: Yes: Alert, Oriented


Psychiatric: Yes: Alert, Oriented


Labs: 


                                    CBC, BMP





                                 11/03/20 05:49 





                                 11/04/20 05:58 











Discharge Summary


Problems reviewed: Yes


Reason For Visit: TRANSIENT ISCHEMIC ATTACK


Current Active Problems





Altered mental status (Acute)








Condition: Stable





- Instructions


Diet, Activity, Other Instructions: 


Take warfarin 5 mg po daily, 7 days a week





Dr Zimmerman's office will reach out to you for repeat INR check in 1 week, if they 

don't call you, please call them at [phone]





Please follow up with Juan Osman on 11/25/20 at 1 pm in his office





Please follow up with Dr Giacomo Tolentino on 11/9/20 at 3:45 pm


Referrals: 


Juan Zimmerman MD [Staff Physician] -  (11/25/20 at 1:30pm)


Du Raza MD, MD [Primary Care Provider] - 


Giacomo Tolentino MD [Staff Physician] - 11/09/20 3:45 am


Disposition: HOME





- Home Medications


Comprehensive Discharge Medication List: 


Ambulatory Orders





Hydrochlorothiazide [Hctz] 12.5 mg PO DAILY 08/03/12 


Atorvastatin Ca [Lipitor] 10 mg PO HS 09/19/16 


Carvedilol 25 mg PO BID 09/19/16 


Losartan Potassium [Cozaar -] 50 mg PO DAILY 09/19/16 


Warfarin Sodium [Coumadin] 5 mg PO ASDIR 09/20/16 


Amlodipine Besylate 5 mg PO DAILY 01/22/18 


Aspirin [ASA -] 81 mg PO DAILY #30 tab.chew 11/03/20 


Warfarin Na [Coumadin -] 5 mg PO DAILY@1800  tablet 11/03/20 








Prescription Drug Monitoring Program (I-STOP) results: I-STOP reviewed and no 

issues identified

## 2021-08-09 ENCOUNTER — HOSPITAL ENCOUNTER (OUTPATIENT)
Dept: HOSPITAL 74 - JASU-SURG | Age: 86
Discharge: HOME | End: 2021-08-09
Attending: INTERNAL MEDICINE
Payer: COMMERCIAL

## 2021-08-09 VITALS — BODY MASS INDEX: 29.9 KG/M2

## 2021-08-09 VITALS — TEMPERATURE: 97.4 F | DIASTOLIC BLOOD PRESSURE: 76 MMHG | HEART RATE: 58 BPM | SYSTOLIC BLOOD PRESSURE: 145 MMHG

## 2021-08-09 DIAGNOSIS — I49.5: ICD-10-CM

## 2021-08-09 DIAGNOSIS — Z45.010: Primary | ICD-10-CM

## 2021-08-09 DIAGNOSIS — I48.91: ICD-10-CM

## 2021-08-09 DIAGNOSIS — Z95.0: ICD-10-CM

## 2021-08-09 PROCEDURE — 33228 REMV&REPLC PM GEN DUAL LEAD: CPT

## 2021-08-09 PROCEDURE — 0JPT0PZ REMOVAL OF CARDIAC RHYTHM RELATED DEVICE FROM TRUNK SUBCUTANEOUS TISSUE AND FASCIA, OPEN APPROACH: ICD-10-PCS | Performed by: INTERNAL MEDICINE

## 2021-08-09 PROCEDURE — 0JH606Z INSERTION OF PACEMAKER, DUAL CHAMBER INTO CHEST SUBCUTANEOUS TISSUE AND FASCIA, OPEN APPROACH: ICD-10-PCS | Performed by: INTERNAL MEDICINE
